# Patient Record
Sex: FEMALE | URBAN - METROPOLITAN AREA
[De-identification: names, ages, dates, MRNs, and addresses within clinical notes are randomized per-mention and may not be internally consistent; named-entity substitution may affect disease eponyms.]

---

## 2017-11-14 ENCOUNTER — INPATIENT (INPATIENT)
Facility: HOSPITAL | Age: 62
LOS: 1 days | Discharge: HOME HEALTH | End: 2017-11-16
Attending: INTERNAL MEDICINE | Admitting: INTERNAL MEDICINE
Payer: COMMERCIAL

## 2017-11-14 VITALS
HEART RATE: 100 BPM | TEMPERATURE: 98 F | SYSTOLIC BLOOD PRESSURE: 122 MMHG | DIASTOLIC BLOOD PRESSURE: 61 MMHG | RESPIRATION RATE: 16 BRPM | OXYGEN SATURATION: 100 %

## 2017-11-14 LAB
ALBUMIN SERPL ELPH-MCNC: 4.4 G/DL — SIGNIFICANT CHANGE UP (ref 3.3–5)
ALP SERPL-CCNC: 45 U/L — SIGNIFICANT CHANGE UP (ref 40–120)
ALT FLD-CCNC: 23 U/L — SIGNIFICANT CHANGE UP (ref 4–33)
APPEARANCE UR: CLEAR — SIGNIFICANT CHANGE UP
AST SERPL-CCNC: 20 U/L — SIGNIFICANT CHANGE UP (ref 4–32)
BASOPHILS # BLD AUTO: 0.01 K/UL — SIGNIFICANT CHANGE UP (ref 0–0.2)
BASOPHILS NFR BLD AUTO: 0.2 % — SIGNIFICANT CHANGE UP (ref 0–2)
BILIRUB SERPL-MCNC: 0.4 MG/DL — SIGNIFICANT CHANGE UP (ref 0.2–1.2)
BILIRUB UR-MCNC: NEGATIVE — SIGNIFICANT CHANGE UP
BLOOD UR QL VISUAL: HIGH
BUN SERPL-MCNC: 15 MG/DL — SIGNIFICANT CHANGE UP (ref 7–23)
CALCIUM SERPL-MCNC: 9.6 MG/DL — SIGNIFICANT CHANGE UP (ref 8.4–10.5)
CHLORIDE SERPL-SCNC: 102 MMOL/L — SIGNIFICANT CHANGE UP (ref 98–107)
CO2 SERPL-SCNC: 31 MMOL/L — SIGNIFICANT CHANGE UP (ref 22–31)
COLOR SPEC: YELLOW — SIGNIFICANT CHANGE UP
CREAT SERPL-MCNC: 0.78 MG/DL — SIGNIFICANT CHANGE UP (ref 0.5–1.3)
CRP SERPL-MCNC: 0.4 MG/L — SIGNIFICANT CHANGE UP
EOSINOPHIL # BLD AUTO: 0.09 K/UL — SIGNIFICANT CHANGE UP (ref 0–0.5)
EOSINOPHIL NFR BLD AUTO: 1.5 % — SIGNIFICANT CHANGE UP (ref 0–6)
ERYTHROCYTE [SEDIMENTATION RATE] IN BLOOD: 16 MM/HR — SIGNIFICANT CHANGE UP (ref 4–25)
GLUCOSE SERPL-MCNC: 128 MG/DL — HIGH (ref 70–99)
GLUCOSE UR-MCNC: NEGATIVE — SIGNIFICANT CHANGE UP
HCT VFR BLD CALC: 36.8 % — SIGNIFICANT CHANGE UP (ref 34.5–45)
HGB BLD-MCNC: 11.7 G/DL — SIGNIFICANT CHANGE UP (ref 11.5–15.5)
IMM GRANULOCYTES # BLD AUTO: 0.01 # — SIGNIFICANT CHANGE UP
IMM GRANULOCYTES NFR BLD AUTO: 0.2 % — SIGNIFICANT CHANGE UP (ref 0–1.5)
KETONES UR-MCNC: NEGATIVE — SIGNIFICANT CHANGE UP
LEUKOCYTE ESTERASE UR-ACNC: NEGATIVE — SIGNIFICANT CHANGE UP
LYMPHOCYTES # BLD AUTO: 2.11 K/UL — SIGNIFICANT CHANGE UP (ref 1–3.3)
LYMPHOCYTES # BLD AUTO: 36.1 % — SIGNIFICANT CHANGE UP (ref 13–44)
MCHC RBC-ENTMCNC: 30.5 PG — SIGNIFICANT CHANGE UP (ref 27–34)
MCHC RBC-ENTMCNC: 31.8 % — LOW (ref 32–36)
MCV RBC AUTO: 95.8 FL — SIGNIFICANT CHANGE UP (ref 80–100)
MONOCYTES # BLD AUTO: 0.62 K/UL — SIGNIFICANT CHANGE UP (ref 0–0.9)
MONOCYTES NFR BLD AUTO: 10.6 % — SIGNIFICANT CHANGE UP (ref 2–14)
MUCOUS THREADS # UR AUTO: SIGNIFICANT CHANGE UP
NEUTROPHILS # BLD AUTO: 3 K/UL — SIGNIFICANT CHANGE UP (ref 1.8–7.4)
NEUTROPHILS NFR BLD AUTO: 51.4 % — SIGNIFICANT CHANGE UP (ref 43–77)
NITRITE UR-MCNC: NEGATIVE — SIGNIFICANT CHANGE UP
NON-SQ EPI CELLS # UR AUTO: <1 — SIGNIFICANT CHANGE UP
NRBC # FLD: 0 — SIGNIFICANT CHANGE UP
PH UR: 6.5 — SIGNIFICANT CHANGE UP (ref 4.6–8)
PLATELET # BLD AUTO: 224 K/UL — SIGNIFICANT CHANGE UP (ref 150–400)
PMV BLD: 11.1 FL — SIGNIFICANT CHANGE UP (ref 7–13)
POTASSIUM SERPL-MCNC: 4 MMOL/L — SIGNIFICANT CHANGE UP (ref 3.5–5.3)
POTASSIUM SERPL-SCNC: 4 MMOL/L — SIGNIFICANT CHANGE UP (ref 3.5–5.3)
PROT SERPL-MCNC: 7.5 G/DL — SIGNIFICANT CHANGE UP (ref 6–8.3)
PROT UR-MCNC: 10 — SIGNIFICANT CHANGE UP
RBC # BLD: 3.84 M/UL — SIGNIFICANT CHANGE UP (ref 3.8–5.2)
RBC # FLD: 12.8 % — SIGNIFICANT CHANGE UP (ref 10.3–14.5)
RBC CASTS # UR COMP ASSIST: SIGNIFICANT CHANGE UP (ref 0–?)
SODIUM SERPL-SCNC: 145 MMOL/L — SIGNIFICANT CHANGE UP (ref 135–145)
SP GR SPEC: 1.02 — SIGNIFICANT CHANGE UP (ref 1–1.03)
SQUAMOUS # UR AUTO: SIGNIFICANT CHANGE UP
UROBILINOGEN FLD QL: 1 E.U. — SIGNIFICANT CHANGE UP (ref 0.1–0.2)
WBC # BLD: 5.84 K/UL — SIGNIFICANT CHANGE UP (ref 3.8–10.5)
WBC # FLD AUTO: 5.84 K/UL — SIGNIFICANT CHANGE UP (ref 3.8–10.5)
WBC CLUMPS #/AREA URNS HPF: PRESENT — HIGH (ref 0–?)
WBC UR QL: SIGNIFICANT CHANGE UP (ref 0–?)

## 2017-11-14 RX ORDER — MORPHINE SULFATE 50 MG/1
4 CAPSULE, EXTENDED RELEASE ORAL ONCE
Qty: 0 | Refills: 0 | Status: DISCONTINUED | OUTPATIENT
Start: 2017-11-14 | End: 2017-11-14

## 2017-11-14 RX ORDER — KETOROLAC TROMETHAMINE 30 MG/ML
30 SYRINGE (ML) INJECTION ONCE
Qty: 0 | Refills: 0 | Status: DISCONTINUED | OUTPATIENT
Start: 2017-11-14 | End: 2017-11-14

## 2017-11-14 RX ORDER — SODIUM CHLORIDE 9 MG/ML
3 INJECTION INTRAMUSCULAR; INTRAVENOUS; SUBCUTANEOUS ONCE
Qty: 0 | Refills: 0 | Status: COMPLETED | OUTPATIENT
Start: 2017-11-14 | End: 2017-11-14

## 2017-11-14 RX ADMIN — Medication 30 MILLIGRAM(S): at 23:58

## 2017-11-14 RX ADMIN — MORPHINE SULFATE 4 MILLIGRAM(S): 50 CAPSULE, EXTENDED RELEASE ORAL at 21:14

## 2017-11-14 RX ADMIN — MORPHINE SULFATE 4 MILLIGRAM(S): 50 CAPSULE, EXTENDED RELEASE ORAL at 21:31

## 2017-11-14 RX ADMIN — SODIUM CHLORIDE 3 MILLILITER(S): 9 INJECTION INTRAMUSCULAR; INTRAVENOUS; SUBCUTANEOUS at 21:13

## 2017-11-14 NOTE — ED PROVIDER NOTE - ATTENDING CONTRIBUTION TO CARE
RISA Attending Note - Dr. Pagan   63 y/o female pmh MS c/o b/l lE pain x2 weeks and low back pain x2 days with paresthesia similar to past MS exacerbations. PE: pt is alert and oriented, perrl, ent normal, membranes are moist, neck supple. no lymphadenopathy or thyroid enlargement, No JVD.  Chest clear to P&A, Heart- reg rhythm without murmur, rubs or gallops, radial pulses equal bilaterally.  Abd is soft, non-tender, Bowel sounds are active. no mass or organomegaly. : No CVA tenderness. Neuro:  Pt alert and oriented x 3. Perrl    Distal neurosensory -pt has subjective numbness and paresthesia left lower leg./. Motor function is 5/5 strength bilaterally.  . Extremities:  No edema.  Skin: warm and dry.  Impression:  MS flare with back pain  Plan: analgesia, muscle relaxation, for pain control labs and possible admission if not relieved.

## 2017-11-14 NOTE — ED ADULT TRIAGE NOTE - CHIEF COMPLAINT QUOTE
Pt c/o lower back pain since Mon morning.  States it presents like this when she has an MS flair up.  Denies trauma

## 2017-11-14 NOTE — ED ADULT NURSE NOTE - OBJECTIVE STATEMENT
Gato Rn note: Pt arrives to rm [address]" I have MS and when I get a flare up I get these bad muscle spasms....this has not happen to me in long time but last time it happe I was in hospital and I think they treated with steriods and oxycodone.....this pain started yesterday. I usually use walker/cane/wheelchair to get around but I can barely get up and move at all." Denies fall, denies any recent trauma. Pt positioned for comofort. call cory in reach. CRUZITO Gomez at bedside to [email or web address] and labs to follow. Handoff report to Primary RN Cristela Gato Rn note: Pt arrives to rm [address] tear[address]" I have MS and when I get a flare up I get these bad muscle spasms....this has not happen to me in long time but last time it happen I was in hospital and I think they treated with steriods and oxycodone.....this pain started yesterday. I usually use walker/cane/wheelchair to get around but I can barely get up and move at all." Denies fall, denies any recent trauma. Pt positioned for comfort call ray in reach. CRUZITO Fuentes at bedside to [email or web address] and labs to follow. Handoff report to Primary RN Cristela

## 2017-11-14 NOTE — ED PROVIDER NOTE - OBJECTIVE STATEMENT
61 y/o female pmh MS c/o b/l lE pain x2 weeks and low back pain x2 days. Pt admits to intermittent b/l LE pain over the last 2 weeks, with new paresthesias in LLE. Pt admits to usually having paresthesias in RLE but feeling the tingling in her left leg now. Pt admits to developing severe low back pain x2 days. PT admits to feeling similar to previous MS flairs. PT admits to doubling her muscle relaxer and gabapentin and taking naproxen with little relief. Pt denies chest pain, sob, abd pain, n/v/d, bowel or bladder incontinence, numbness ,weakness, dizziness, syncope, fever or chills.

## 2017-11-14 NOTE — ED PROVIDER NOTE - PROGRESS NOTE DETAILS
Klepfish: Pain still not well controlled. Admitting for further pain control, likely MS flare. hospitalist requesting neuro consult.

## 2017-11-15 DIAGNOSIS — M54.5 LOW BACK PAIN: ICD-10-CM

## 2017-11-15 DIAGNOSIS — G35 MULTIPLE SCLEROSIS: ICD-10-CM

## 2017-11-15 DIAGNOSIS — I10 ESSENTIAL (PRIMARY) HYPERTENSION: ICD-10-CM

## 2017-11-15 DIAGNOSIS — E78.5 HYPERLIPIDEMIA, UNSPECIFIED: ICD-10-CM

## 2017-11-15 DIAGNOSIS — Z29.9 ENCOUNTER FOR PROPHYLACTIC MEASURES, UNSPECIFIED: ICD-10-CM

## 2017-11-15 DIAGNOSIS — M62.838 OTHER MUSCLE SPASM: ICD-10-CM

## 2017-11-15 LAB
BUN SERPL-MCNC: 11 MG/DL — SIGNIFICANT CHANGE UP (ref 7–23)
CALCIUM SERPL-MCNC: 10.1 MG/DL — SIGNIFICANT CHANGE UP (ref 8.4–10.5)
CHLORIDE SERPL-SCNC: 99 MMOL/L — SIGNIFICANT CHANGE UP (ref 98–107)
CO2 SERPL-SCNC: 23 MMOL/L — SIGNIFICANT CHANGE UP (ref 22–31)
CREAT SERPL-MCNC: 0.81 MG/DL — SIGNIFICANT CHANGE UP (ref 0.5–1.3)
GLUCOSE SERPL-MCNC: 191 MG/DL — HIGH (ref 70–99)
HCT VFR BLD CALC: 41.8 % — SIGNIFICANT CHANGE UP (ref 34.5–45)
HGB BLD-MCNC: 13.2 G/DL — SIGNIFICANT CHANGE UP (ref 11.5–15.5)
MCHC RBC-ENTMCNC: 29.8 PG — SIGNIFICANT CHANGE UP (ref 27–34)
MCHC RBC-ENTMCNC: 31.6 % — LOW (ref 32–36)
MCV RBC AUTO: 94.4 FL — SIGNIFICANT CHANGE UP (ref 80–100)
NRBC # FLD: 0 — SIGNIFICANT CHANGE UP
PLATELET # BLD AUTO: 296 K/UL — SIGNIFICANT CHANGE UP (ref 150–400)
PMV BLD: 11.6 FL — SIGNIFICANT CHANGE UP (ref 7–13)
POTASSIUM SERPL-MCNC: 4.1 MMOL/L — SIGNIFICANT CHANGE UP (ref 3.5–5.3)
POTASSIUM SERPL-SCNC: 4.1 MMOL/L — SIGNIFICANT CHANGE UP (ref 3.5–5.3)
RBC # BLD: 4.43 M/UL — SIGNIFICANT CHANGE UP (ref 3.8–5.2)
RBC # FLD: 12.7 % — SIGNIFICANT CHANGE UP (ref 10.3–14.5)
SODIUM SERPL-SCNC: 141 MMOL/L — SIGNIFICANT CHANGE UP (ref 135–145)
WBC # BLD: 5.62 K/UL — SIGNIFICANT CHANGE UP (ref 3.8–10.5)
WBC # FLD AUTO: 5.62 K/UL — SIGNIFICANT CHANGE UP (ref 3.8–10.5)

## 2017-11-15 PROCEDURE — 72156 MRI NECK SPINE W/O & W/DYE: CPT | Mod: 26

## 2017-11-15 PROCEDURE — 99223 1ST HOSP IP/OBS HIGH 75: CPT

## 2017-11-15 PROCEDURE — 72157 MRI CHEST SPINE W/O & W/DYE: CPT | Mod: 26

## 2017-11-15 PROCEDURE — 70553 MRI BRAIN STEM W/O & W/DYE: CPT | Mod: 26

## 2017-11-15 PROCEDURE — 72158 MRI LUMBAR SPINE W/O & W/DYE: CPT | Mod: 26

## 2017-11-15 RX ORDER — DIMETHYL FUMARATE 240 MG/1
240 CAPSULE ORAL
Qty: 0 | Refills: 0 | Status: DISCONTINUED | OUTPATIENT
Start: 2017-11-15 | End: 2017-11-16

## 2017-11-15 RX ORDER — GABAPENTIN 400 MG/1
300 CAPSULE ORAL
Qty: 0 | Refills: 0 | Status: DISCONTINUED | OUTPATIENT
Start: 2017-11-15 | End: 2017-11-16

## 2017-11-15 RX ORDER — ATORVASTATIN CALCIUM 80 MG/1
40 TABLET, FILM COATED ORAL AT BEDTIME
Qty: 0 | Refills: 0 | Status: DISCONTINUED | OUTPATIENT
Start: 2017-11-15 | End: 2017-11-16

## 2017-11-15 RX ORDER — SENNA PLUS 8.6 MG/1
2 TABLET ORAL AT BEDTIME
Qty: 0 | Refills: 0 | Status: DISCONTINUED | OUTPATIENT
Start: 2017-11-15 | End: 2017-11-16

## 2017-11-15 RX ORDER — LORATADINE 10 MG/1
10 TABLET ORAL DAILY
Qty: 0 | Refills: 0 | Status: DISCONTINUED | OUTPATIENT
Start: 2017-11-15 | End: 2017-11-16

## 2017-11-15 RX ORDER — OXYBUTYNIN CHLORIDE 5 MG
5 TABLET ORAL
Qty: 0 | Refills: 0 | Status: DISCONTINUED | OUTPATIENT
Start: 2017-11-15 | End: 2017-11-16

## 2017-11-15 RX ORDER — ASCORBIC ACID 60 MG
1 TABLET,CHEWABLE ORAL
Qty: 0 | Refills: 0 | COMMUNITY

## 2017-11-15 RX ORDER — LISINOPRIL 2.5 MG/1
20 TABLET ORAL DAILY
Qty: 0 | Refills: 0 | Status: DISCONTINUED | OUTPATIENT
Start: 2017-11-15 | End: 2017-11-16

## 2017-11-15 RX ORDER — CYCLOBENZAPRINE HYDROCHLORIDE 10 MG/1
5 TABLET, FILM COATED ORAL THREE TIMES A DAY
Qty: 0 | Refills: 0 | Status: DISCONTINUED | OUTPATIENT
Start: 2017-11-15 | End: 2017-11-15

## 2017-11-15 RX ORDER — AMLODIPINE BESYLATE 2.5 MG/1
10 TABLET ORAL DAILY
Qty: 0 | Refills: 0 | Status: DISCONTINUED | OUTPATIENT
Start: 2017-11-15 | End: 2017-11-16

## 2017-11-15 RX ORDER — ACETAMINOPHEN 500 MG
650 TABLET ORAL EVERY 6 HOURS
Qty: 0 | Refills: 0 | Status: DISCONTINUED | OUTPATIENT
Start: 2017-11-15 | End: 2017-11-16

## 2017-11-15 RX ORDER — AMLODIPINE BESYLATE AND BENAZEPRIL HYDROCHLORIDE 10; 20 MG/1; MG/1
1 CAPSULE ORAL
Qty: 0 | Refills: 0 | COMMUNITY

## 2017-11-15 RX ORDER — ROSUVASTATIN CALCIUM 5 MG/1
1 TABLET ORAL
Qty: 0 | Refills: 0 | COMMUNITY

## 2017-11-15 RX ORDER — CYCLOBENZAPRINE HYDROCHLORIDE 10 MG/1
10 TABLET, FILM COATED ORAL EVERY 8 HOURS
Qty: 0 | Refills: 0 | Status: DISCONTINUED | OUTPATIENT
Start: 2017-11-15 | End: 2017-11-16

## 2017-11-15 RX ORDER — SOLIFENACIN SUCCINATE 10 MG/1
1 TABLET ORAL
Qty: 0 | Refills: 0 | COMMUNITY

## 2017-11-15 RX ORDER — GABAPENTIN 400 MG/1
1 CAPSULE ORAL
Qty: 0 | Refills: 0 | COMMUNITY

## 2017-11-15 RX ORDER — LOSARTAN POTASSIUM 100 MG/1
100 TABLET, FILM COATED ORAL DAILY
Qty: 0 | Refills: 0 | Status: DISCONTINUED | OUTPATIENT
Start: 2017-11-15 | End: 2017-11-16

## 2017-11-15 RX ORDER — DIMETHYL FUMARATE 240 MG/1
1 CAPSULE ORAL
Qty: 0 | Refills: 0 | COMMUNITY

## 2017-11-15 RX ORDER — TIZANIDINE 4 MG/1
1 TABLET ORAL
Qty: 0 | Refills: 0 | COMMUNITY

## 2017-11-15 RX ORDER — INFLUENZA VIRUS VACCINE 15; 15; 15; 15 UG/.5ML; UG/.5ML; UG/.5ML; UG/.5ML
0.5 SUSPENSION INTRAMUSCULAR ONCE
Qty: 0 | Refills: 0 | Status: DISCONTINUED | OUTPATIENT
Start: 2017-11-15 | End: 2017-11-16

## 2017-11-15 RX ORDER — LOSARTAN POTASSIUM 100 MG/1
1 TABLET, FILM COATED ORAL
Qty: 0 | Refills: 0 | COMMUNITY

## 2017-11-15 RX ORDER — MORPHINE SULFATE 50 MG/1
4 CAPSULE, EXTENDED RELEASE ORAL ONCE
Qty: 0 | Refills: 0 | Status: DISCONTINUED | OUTPATIENT
Start: 2017-11-15 | End: 2017-11-15

## 2017-11-15 RX ORDER — HEPARIN SODIUM 5000 [USP'U]/ML
5000 INJECTION INTRAVENOUS; SUBCUTANEOUS EVERY 8 HOURS
Qty: 0 | Refills: 0 | Status: DISCONTINUED | OUTPATIENT
Start: 2017-11-15 | End: 2017-11-16

## 2017-11-15 RX ORDER — MORPHINE SULFATE 50 MG/1
2 CAPSULE, EXTENDED RELEASE ORAL EVERY 4 HOURS
Qty: 0 | Refills: 0 | Status: DISCONTINUED | OUTPATIENT
Start: 2017-11-15 | End: 2017-11-16

## 2017-11-15 RX ADMIN — Medication 5 MILLIGRAM(S): at 06:04

## 2017-11-15 RX ADMIN — LISINOPRIL 20 MILLIGRAM(S): 2.5 TABLET ORAL at 06:04

## 2017-11-15 RX ADMIN — HEPARIN SODIUM 5000 UNIT(S): 5000 INJECTION INTRAVENOUS; SUBCUTANEOUS at 15:19

## 2017-11-15 RX ADMIN — GABAPENTIN 300 MILLIGRAM(S): 400 CAPSULE ORAL at 06:04

## 2017-11-15 RX ADMIN — AMLODIPINE BESYLATE 10 MILLIGRAM(S): 2.5 TABLET ORAL at 06:04

## 2017-11-15 RX ADMIN — GABAPENTIN 300 MILLIGRAM(S): 400 CAPSULE ORAL at 17:49

## 2017-11-15 RX ADMIN — MORPHINE SULFATE 4 MILLIGRAM(S): 50 CAPSULE, EXTENDED RELEASE ORAL at 02:16

## 2017-11-15 RX ADMIN — GABAPENTIN 300 MILLIGRAM(S): 400 CAPSULE ORAL at 12:46

## 2017-11-15 RX ADMIN — Medication 30 MILLIGRAM(S): at 00:13

## 2017-11-15 RX ADMIN — Medication 1 TABLET(S): at 15:19

## 2017-11-15 RX ADMIN — LORATADINE 10 MILLIGRAM(S): 10 TABLET ORAL at 15:19

## 2017-11-15 RX ADMIN — Medication 108 MILLIGRAM(S): at 02:01

## 2017-11-15 RX ADMIN — LOSARTAN POTASSIUM 100 MILLIGRAM(S): 100 TABLET, FILM COATED ORAL at 06:04

## 2017-11-15 RX ADMIN — ATORVASTATIN CALCIUM 40 MILLIGRAM(S): 80 TABLET, FILM COATED ORAL at 21:57

## 2017-11-15 RX ADMIN — MORPHINE SULFATE 4 MILLIGRAM(S): 50 CAPSULE, EXTENDED RELEASE ORAL at 02:01

## 2017-11-15 RX ADMIN — HEPARIN SODIUM 5000 UNIT(S): 5000 INJECTION INTRAVENOUS; SUBCUTANEOUS at 06:04

## 2017-11-15 RX ADMIN — CYCLOBENZAPRINE HYDROCHLORIDE 10 MILLIGRAM(S): 10 TABLET, FILM COATED ORAL at 21:57

## 2017-11-15 RX ADMIN — Medication 104 MILLIGRAM(S): at 17:49

## 2017-11-15 RX ADMIN — Medication 5 MILLIGRAM(S): at 17:49

## 2017-11-15 NOTE — CONSULT NOTE ADULT - SUBJECTIVE AND OBJECTIVE BOX
Neurology Consult    Name  HOWARD BILLINGSLEY 62F with history of MS (dx in 2005, has chronic RUE and RLE numbness and weakness), HTN, HLD, and OA who presents to the hospital with complaints of worsening LLE weakness, numbness and tingling now complicated by muscle spasms of her lower back. Said that her MS had been stable since her last MS exacerbation in 2012 until this summer. She had started to note some weakness and numbness of her LLE in July/August and was supposed to visit her neurologist for evaluation but had delayed on the visit because she had family issues come up that caused her to go to [place]. She said that over the past 2 weeks she has noted significantly worsening numbness and tingling of her LLE with significant weakness also. She usually walks with the aid of a cane/walker but recently has had to use a motorized wheelchair more frequently. Said that she has also had increasing muscle spasms in her lower back, especially over the past 2 days, and these have kept her from being able to sleep. She has been taking her home medications for her symptoms without any relief and therefore decided to come to the hospital for further evaluation. She currently said that she is having LLE weakness, numbness and tingling. Said that she has lower back pain/muscle spasms when she moves but not when she is laying still. Denies any perineal numbness, denies any urinary or fecal incontinence. No other complaints. States that her last MS exacerbation was in 2012, had similar symptoms at that time also.                                                                 MEDICATIONS  (STANDING):  amLODIPine   Tablet 10 milliGRAM(s) Oral daily  atorvastatin 40 milliGRAM(s) Oral at bedtime  gabapentin 300 milliGRAM(s) Oral four times a day  heparin  Injectable 5000 Unit(s) SubCutaneous every 8 hours  lisinopril 20 milliGRAM(s) Oral daily  loratadine 10 milliGRAM(s) Oral daily  losartan 100 milliGRAM(s) Oral daily  multivitamin 1 Tablet(s) Oral daily  oxybutynin 5 milliGRAM(s) Oral two times a day    MEDICATIONS  (PRN):  acetaminophen   Tablet. 650 milliGRAM(s) Oral every 6 hours PRN Mild to moderate pain  bisacodyl 5 milliGRAM(s) Oral daily PRN Constipation  cyclobenzaprine 5 milliGRAM(s) Oral three times a day PRN Muscle Spasm  morphine  - Injectable 2 milliGRAM(s) IV Push every 4 hours PRN Severe Pain (7 - 10)  senna 2 Tablet(s) Oral at bedtime PRN Constipation      Allergies    penicillin (Unknown)    Intolerances        Objective  Vital Signs Last 24 Hrs  T(C): 37 (15 Nov 2017 04:13), Max: 37.1 (15 Nov 2017 00:02)  T(F): 98.6 (15 Nov 2017 04:13), Max: 98.8 (15 Nov 2017 00:02)  HR: 95 (15 Nov 2017 04:13) (73 - 100)  BP: 128/61 (15 Nov 2017 04:13) (107/50 - 137/63)  BP(mean): --  RR: 18 (15 Nov 2017 04:13) (15 - 18)  SpO2: 100% (15 Nov 2017 04:13) (100% - 100%)    General Exam   General appearance: No acute distress, well-nourished  Respiratory:    non-labored respirations               Neurological Exam  Mental Status:  alert and oriented x3, fluent speech, following commands, repetition and naming intact    Cranial Nerves: PERRL, EOMI without nystagmus, visual fields intact no facial droop, no dysarthria    Motor:   Tone:   normal               Strength:  Upper extremity                          Delt       Bicep    Tricep                                                  R         5/5        5/5        5/5       5/5                                               L          5/5        5/5        5/5      5/5    Lower extremity                           HF          KE          KF        DF         PF                                               R        5/5        5/5        5/5       5/5       5/5                                               L         5/5        5/5        5/5      5/5        5/5    Pronator drift:   none           Dysmetria: none with finger-to-nose testing  Tremor:  none appreciated at rest or in action    Sensation: intact grossly to light touch    Deep Tendon Reflexes:   Toes flexor bilaterally ________    Gait:     Other Studies    11-14    145  |  102  |  15  ----------------------------<  128<H>  4.0   |  31  |  0.78    Ca    9.6      14 Nov 2017 21:22    TPro  7.5  /  Alb  4.4  /  TBili  0.4  /  DBili  x   /  AST  20  /  ALT  23  /  AlkPhos  45  11-14 11-14    145  |  102  |  15  ----------------------------<  128<H>  4.0   |  31  |  0.78    Ca    9.6      14 Nov 2017 21:22    TPro  7.5  /  Alb  4.4  /  TBili  0.4  /  DBili  x   /  AST  20  /  ALT  23  /  AlkPhos  45  11-14    LIVER FUNCTIONS - ( 14 Nov 2017 21:22 )  Alb: 4.4 g/dL / Pro: 7.5 g/dL / ALK PHOS: 45 u/L / ALT: 23 u/L / AST: 20 u/L / GGT: x             Radiology    CTh/CTA:  MRI/MRA:  TTE:  EEG: Neurology Consult    Name  HOWARD BILLINGSLEY 62F with history of MS (dx in 2005, has chronic RUE and RLE numbness and weakness), HTN, HLD, and OA who presents to the hospital with complaints of worsening LLE weakness, numbness and tingling now complicated by pain of her lower back. Said that her MS had been stable since her last MS exacerbation in 2012 until this summer. She had started to note some weakness and numbness of her LLE in July/August and was supposed to visit her neurologist for evaluation but had delayed on the visit because she had family issues come up that caused her to go to [place]. She said that over the past 2 weeks she has noted significantly worsening numbness and tingling of her LLE with significant weakness also. She usually walks with the aid of a cane/walker but recently has had to use a motorized wheelchair more frequently. Said that she has also had increasing pain in her lower back, especially over the past 2 days, and these have kept her from being able to sleep. She has been taking her home medications for her symptoms without any relief and therefore decided to come to the hospital for further evaluation. Said that she has lower back pain/muscle spasms when she moves but not when she is laying still. Denies any urinary or fecal incontinence. No other complaints. States that her last MS exacerbation was in 2012, had similar symptoms at that time also.                                                                 MEDICATIONS  (STANDING):  amLODIPine   Tablet 10 milliGRAM(s) Oral daily  atorvastatin 40 milliGRAM(s) Oral at bedtime  gabapentin 300 milliGRAM(s) Oral four times a day  heparin  Injectable 5000 Unit(s) SubCutaneous every 8 hours  lisinopril 20 milliGRAM(s) Oral daily  loratadine 10 milliGRAM(s) Oral daily  losartan 100 milliGRAM(s) Oral daily  multivitamin 1 Tablet(s) Oral daily  oxybutynin 5 milliGRAM(s) Oral two times a day    MEDICATIONS  (PRN):  acetaminophen   Tablet. 650 milliGRAM(s) Oral every 6 hours PRN Mild to moderate pain  bisacodyl 5 milliGRAM(s) Oral daily PRN Constipation  cyclobenzaprine 5 milliGRAM(s) Oral three times a day PRN Muscle Spasm  morphine  - Injectable 2 milliGRAM(s) IV Push every 4 hours PRN Severe Pain (7 - 10)  senna 2 Tablet(s) Oral at bedtime PRN Constipation      Allergies    penicillin (Unknown)    Intolerances        Objective  Vital Signs Last 24 Hrs  T(C): 37 (15 Nov 2017 04:13), Max: 37.1 (15 Nov 2017 00:02)  T(F): 98.6 (15 Nov 2017 04:13), Max: 98.8 (15 Nov 2017 00:02)  HR: 95 (15 Nov 2017 04:13) (73 - 100)  BP: 128/61 (15 Nov 2017 04:13) (107/50 - 137/63)  BP(mean): --  RR: 18 (15 Nov 2017 04:13) (15 - 18)  SpO2: 100% (15 Nov 2017 04:13) (100% - 100%)    General Exam   General appearance: No acute distress, well-nourished  Respiratory:    non-labored respirations               Neurological Exam  Mental Status:  alert and oriented x3, fluent speech, following commands, repetition and naming intact    Cranial Nerves: PERRL, EOMI without nystagmus, visual fields intact no facial droop, no dysarthria    Motor:   Tone:   normal               Strength:  Upper extremity                          Delt       Bicep    Tricep                                                  R         4/5        4/5        4/5       4/5                                               L          5/5        5/5        5/5      5/5    Lower extremity                           HF          KE          KF        DF         PF                                               R        3/5        3/5        3/5       0/5       2+/5                                               L         4/5        4/5        4/5      4/5        4/5    Pronator drift:   none           Dysmetria: none with finger-to-nose testing  Tremor:  none appreciated at rest or in action    Sensation: decreased to light touch on right UE/LE    Deep Tendon Reflexes: Brisk throughout UE/LE, Ankle clonus few beats on left side, sustained on right side   Toes  mute bilaterally     Gait: patient able to walk due to pain     Other Studies    11-14    145  |  102  |  15  ----------------------------<  128<H>  4.0   |  31  |  0.78    Ca    9.6      14 Nov 2017 21:22    TPro  7.5  /  Alb  4.4  /  TBili  0.4  /  DBili  x   /  AST  20  /  ALT  23  /  AlkPhos  45  11-14    11-14    145  |  102  |  15  ----------------------------<  128<H>  4.0   |  31  |  0.78    Ca    9.6      14 Nov 2017 21:22    TPro  7.5  /  Alb  4.4  /  TBili  0.4  /  DBili  x   /  AST  20  /  ALT  23  /  AlkPhos  45  11-14    LIVER FUNCTIONS - ( 14 Nov 2017 21:22 )  Alb: 4.4 g/dL / Pro: 7.5 g/dL / ALK PHOS: 45 u/L / ALT: 23 u/L / AST: 20 u/L / GGT: x             Radiology    CTh/CTA:  MRI/MRA:  TTE:  EEG: Neurology Consult    Name  HOWARD BILLINGSLEY 62F with history of MS (dx in 2005, has chronic RUE and RLE numbness and weakness), HTN, HLD, and OA who presents to the hospital with complaints of worsening LLE weakness, numbness and tingling now complicated by pain of her lower back. Said that her MS had been stable since her last MS exacerbation in 2012 until this summer. She had started to note some weakness and numbness of her LLE in July/August and was supposed to visit her neurologist for evaluation but had delayed on the visit because she had family issues come up that caused her to go to [place]. She said that over the past 2 weeks she has noted significantly worsening numbness and tingling of her LLE with significant weakness also. She usually walks with the aid of a cane/walker but recently has had to use a motorized wheelchair more frequently. Said that she has also had increasing pain in her lower back, especially over the past 2 days, and these have kept her from being able to sleep. She has been taking her home medications for her symptoms without any relief and therefore decided to come to the hospital for further evaluation. Said that she has lower back pain/muscle spasms when she moves but not when she is laying still. Denies any urinary or fecal incontinence. No other complaints. States that her last MS exacerbation was in 2012, had similar symptoms at that time also.                                                                 MEDICATIONS  (STANDING):  amLODIPine   Tablet 10 milliGRAM(s) Oral daily  atorvastatin 40 milliGRAM(s) Oral at bedtime  gabapentin 300 milliGRAM(s) Oral four times a day  heparin  Injectable 5000 Unit(s) SubCutaneous every 8 hours  lisinopril 20 milliGRAM(s) Oral daily  loratadine 10 milliGRAM(s) Oral daily  losartan 100 milliGRAM(s) Oral daily  multivitamin 1 Tablet(s) Oral daily  oxybutynin 5 milliGRAM(s) Oral two times a day    MEDICATIONS  (PRN):  acetaminophen   Tablet. 650 milliGRAM(s) Oral every 6 hours PRN Mild to moderate pain  bisacodyl 5 milliGRAM(s) Oral daily PRN Constipation  cyclobenzaprine 5 milliGRAM(s) Oral three times a day PRN Muscle Spasm  morphine  - Injectable 2 milliGRAM(s) IV Push every 4 hours PRN Severe Pain (7 - 10)  senna 2 Tablet(s) Oral at bedtime PRN Constipation      Allergies    penicillin (Unknown)    Intolerances        Objective  Vital Signs Last 24 Hrs  T(C): 37 (15 Nov 2017 04:13), Max: 37.1 (15 Nov 2017 00:02)  T(F): 98.6 (15 Nov 2017 04:13), Max: 98.8 (15 Nov 2017 00:02)  HR: 95 (15 Nov 2017 04:13) (73 - 100)  BP: 128/61 (15 Nov 2017 04:13) (107/50 - 137/63)  BP(mean): --  RR: 18 (15 Nov 2017 04:13) (15 - 18)  SpO2: 100% (15 Nov 2017 04:13) (100% - 100%)    General Exam   General appearance: No acute distress, well-nourished  Respiratory:    non-labored respirations               Neurological Exam  Mental Status:  alert and oriented x3, fluent speech, following commands, repetition and naming intact    Cranial Nerves: PERRL, EOMI without nystagmus, visual fields intact no facial droop, no dysarthria    Motor:   Tone:   normal               Strength:  Upper extremity                          Delt       Bicep    Tricep                                                  R         4/5        4/5        4/5       4/5                                               L          5/5        5/5        5/5      5/5    Lower extremity                           HF          KE          KF        DF         PF                                               R        3/5        3/5        3/5       0/5       2+/5                                               L         3+/5        3+/5        3+/5      3+/5        3+/5    Pronator drift:   none           Dysmetria: none with finger-to-nose testing  Tremor:  none appreciated at rest or in action    Sensation: decreased to light touch on right UE/LE    Deep Tendon Reflexes: Brisk throughout UE/LE, Ankle clonus few beats on left side, sustained on right side   Toes  mute bilaterally     Gait: patient able to walk due to pain     Other Studies    11-14    145  |  102  |  15  ----------------------------<  128<H>  4.0   |  31  |  0.78    Ca    9.6      14 Nov 2017 21:22    TPro  7.5  /  Alb  4.4  /  TBili  0.4  /  DBili  x   /  AST  20  /  ALT  23  /  AlkPhos  45  11-14    11-14    145  |  102  |  15  ----------------------------<  128<H>  4.0   |  31  |  0.78    Ca    9.6      14 Nov 2017 21:22    TPro  7.5  /  Alb  4.4  /  TBili  0.4  /  DBili  x   /  AST  20  /  ALT  23  /  AlkPhos  45  11-14    LIVER FUNCTIONS - ( 14 Nov 2017 21:22 )  Alb: 4.4 g/dL / Pro: 7.5 g/dL / ALK PHOS: 45 u/L / ALT: 23 u/L / AST: 20 u/L / GGT: x             Radiology    CTh/CTA:  MRI/MRA:  TTE:  EEG:

## 2017-11-15 NOTE — H&P ADULT - HISTORY OF PRESENT ILLNESS
This is a 62F with history of MS (dx in 2005, has chronic RUE and RLE numbness and weakness), HTN, HLD, and OA who presents to the hospital with complaints of worsening LLE weakness, numbness and tingling now complicated by muscle spasms of her lower back. Said that her MS had been stable since her last MS exacerbation in 2012 until this summer. She had started to note some weakness and numbness of her LLE in July/August and was supposed to visit her neurologist for evaluation but had delayed on the visit because she had family issues come up that caused her to go to [place]. She said that over the past 2 weeks she has noted significantly worsening numbness and tingling of her LLE with significant weakness also. She usually walks with the aid of a cane/walker but recently has had to use a motorized wheelchair more frequently. Said that she has also had increasing muscle spasms in her lower back, especially over the past 2 days, and these have kept her from being able to sleep. She has been taking her home medications for her symptoms without any relief and therefore decided to come to the hospital for further evaluation. She currently said that she is having LLE weakness, numbness and tingling. Said that she has lower back pain/muscle spasms when she moves but not when she is laying still. Denies any perineal numbness, denies any urinary or fecal incontinence. No other complaints.    In the ED, her vitals were T 97.7, P 100, /61, R 16, O2 sat 100% RA. Her lab work did not reveal any significant abnormalities. She was given morphine 4mg IVP x2, valium 5mg PO x1, toradol 30mg IVP x1, and solumedrol 1000mg IVPB x1. She was admitted to medicine. This is a 62F with history of MS (dx in 2005, has chronic RUE and RLE numbness and weakness), HTN, HLD, and OA who presents to the hospital with complaints of worsening LLE weakness, numbness and tingling now complicated by muscle spasms of her lower back. Said that her MS had been stable since her last MS exacerbation in 2012 until this summer. She had started to note some weakness and numbness of her LLE in July/August and was supposed to visit her neurologist for evaluation but had delayed on the visit because she had family issues come up that caused her to go to [place]. She said that over the past 2 weeks she has noted significantly worsening numbness and tingling of her LLE with significant weakness also. She usually walks with the aid of a cane/walker but recently has had to use a motorized wheelchair more frequently. Said that she has also had increasing muscle spasms in her lower back, especially over the past 2 days, and these have kept her from being able to sleep. She has been taking her home medications for her symptoms without any relief and therefore decided to come to the hospital for further evaluation. She currently said that she is having LLE weakness, numbness and tingling. Said that she has lower back pain/muscle spasms when she moves but not when she is laying still. Denies any perineal numbness, denies any urinary or fecal incontinence. No other complaints. States that her last MS exacerbation was in 2012, had similar symptoms at that time also.     In the ED, her vitals were T 97.7, P 100, /61, R 16, O2 sat 100% RA. Her lab work did not reveal any significant abnormalities. She was given morphine 4mg IVP x2, valium 5mg PO x1, toradol 30mg IVP x1, and solumedrol 1000mg IVPB x1. She was admitted to medicine.

## 2017-11-15 NOTE — H&P ADULT - NSHPSOCIALHISTORY_GEN_ALL_CORE
Social History:    Marital Status:  ( X )    (   ) Single    (   )    (  )   Occupation: Retired, on disability, used to work in [employer] department previously  Lives with: (  ) alone  ( X ) children   ( X ) spouse   (  ) parents  (  ) other    Substance Use (street drugs): ( X ) never used  (  ) other:  Tobacco Usage:  (   ) never smoked   ( X ) former smoker   (   ) current smoker  (smoked 1PPD x 12 years) pack year  (Quit at age 27 ) last cigarette date  Alcohol Usage: Denies

## 2017-11-15 NOTE — H&P ADULT - ASSESSMENT
This is a 62F with history of MS, HTN, HLD, and OA who presents to the hospital with complaints of worsening LLE weakness, numbness and tingling concerning for MS exacerbation.

## 2017-11-15 NOTE — CONSULT NOTE ADULT - ATTENDING COMMENTS
Patient has symptoms which seem to be radicular in origin.  MRI brain and spine shows no enhancing lesions.  She has 5/5 left foot DF today and feels better after steroids.  MRI LS spine shows L5S1 disc protrusion abutting right L5 nerve root.  Would switch steroids to prednisone taper, cont flexeril and gabapentin.  Can be DC'd home from neuro standpoint, she will f/u with her neurologist at [facility]

## 2017-11-15 NOTE — PROGRESS NOTE ADULT - PROBLEM SELECTOR PLAN 1
c/w solumedrol for now  increase flexeril to 10mg tid standing.  start tecfidera at home dose stop solumedrol  prednison 60mg po. decrease by 20mg every 3 days  increase flexeril to 10mg tid standing.  start tecfidera at home dose

## 2017-11-15 NOTE — H&P ADULT - NSHPLABSRESULTS_GEN_ALL_CORE
LABS and ADDITIONAL STUDIES:                        11.7   5.84  )-----------( 224      ( 2017 21:22 )             36.8     11-14    145  |  102  |  15  ----------------------------<  128<H>  4.0   |  31  |  0.78    Ca    9.6      2017 21:22    TPro  7.5  /  Alb  4.4  /  TBili  0.4  /  DBili  x   /  AST  20  /  ALT  23  /  AlkPhos  45  11-14    LIVER FUNCTIONS - ( 2017 21:22 )  Alb: 4.4 g/dL / Pro: 7.5 g/dL / ALK PHOS: 45 u/L / ALT: 23 u/L / AST: 20 u/L / GGT: x           Urinalysis Basic - ( 2017 21:22 )  Color: YELLOW / Appearance: CLEAR / S.017 / pH: 6.5  Gluc: NEGATIVE / Ketone: NEGATIVE  / Bili: NEGATIVE / Urobili: 1 E.U.   Blood: TRACE / Protein: 10 / Nitrite: NEGATIVE   Leuk Esterase: NEGATIVE / RBC: 2-5 / WBC 0-2   Sq Epi: OCC / Non Sq Epi: x / Bacteria: x    ESR 16  CRP 0.4

## 2017-11-15 NOTE — H&P ADULT - NSHPREVIEWOFSYSTEMS_GEN_ALL_CORE
REVIEW OF SYSTEMS:    CONSTITUTIONAL: No generalized weakness, fevers or chills  EYES/ENT: No visual changes;  No dysphagia  NECK: No pain or stiffness  RESPIRATORY: No cough, wheezing, hemoptysis; No shortness of breath  CARDIOVASCULAR: No chest pain or palpitations; No lower extremity edema  GASTROINTESTINAL: No abdominal or epigastric pain. No nausea, vomiting, or hematemesis; No diarrhea or constipation. No melena or hematochezia.  BACK: +Lower back pain/muscle spasms  GENITOURINARY: No dysuria, frequency or hematuria  NEUROLOGICAL: Chronic R sided weakness and numbness, +LLE weakness, numbness and tingling  SKIN: No itching, burning, rashes, or lesions   All other review of systems is negative unless indicated above.

## 2017-11-15 NOTE — H&P ADULT - PROBLEM SELECTOR PLAN 2
- Patient's LBP likely due to muscle spasms  - Will place on trial of flexeril  - Tylenol/morphine prn for pain control

## 2017-11-15 NOTE — CONSULT NOTE ADULT - ASSESSMENT
62F with history of MS, HTN, HLD, and OA who presents to the hospital with complaints of worsening LLE weakness, numbness and tingling concerning for MS exacerbation.      Plan:   - steroids started by ED   - MRI lumbo-sacral spine   - continue home medications 62F with history of MS, HTN, HLD, and OA who presents to the hospital with complaints of worsening LLE weakness, numbness and tingling concerning for MS exacerbation.      Plan:   - solumedrol 500 mg BID for 3 days  - MRI cervical/thoracic w/wo contrast   - MRI brain w/wo contrast   - continue Tecfidera  - continue Gabapentin   - continue home medications 62F with history of MS, HTN, HLD, and OA who presents to the hospital with complaints of worsening LLE weakness, numbness and tingling concerning for MS exacerbation.      Plan:   - solumedrol 500 mg BID for 3 days  - MRI cervical/thoracic w/wo contrast   - MRI brain w/wo contrast   - continue Tecfidera  - continue Gabapentin   - continue home medications   - DVT prophylaxis

## 2017-11-15 NOTE — PHYSICAL THERAPY INITIAL EVALUATION ADULT - PERTINENT HX OF CURRENT PROBLEM, REHAB EVAL
patient presents with left LE weakness and numbness, history of MS (dx in 2005, has chronic RUE and RLE numbness and weakness), HTN, HLD, and OA

## 2017-11-15 NOTE — H&P ADULT - NSHPOUTPATIENTPROVIDERS_GEN_ALL_CORE
PCP - Dr. Sarah Najera [phone]  Neuro - Meghna Wetzel, [facility] [phone] PCP - Dr. Sarah Najera [phone]  Neuro - Dr. Meghna Wetzel [phone]

## 2017-11-15 NOTE — PROGRESS NOTE ADULT - SUBJECTIVE AND OBJECTIVE BOX
Subjective:Interval History - No events overnight    Objective:   Vital Signs Last 24 Hrs  T(C): 36.8 (15 Nov 2017 12:55), Max: 37.1 (15 Nov 2017 00:02)  T(F): 98.3 (15 Nov 2017 12:55), Max: 98.8 (15 Nov 2017 00:02)  HR: 98 (15 Nov 2017 12:55) (73 - 100)  BP: 140/71 (15 Nov 2017 12:55) (107/50 - 144/68)  BP(mean): --  RR: 18 (15 Nov 2017 12:55) (15 - 18)  SpO2: 100% (15 Nov 2017 12:55) (100% - 100%)    Neurological Exam  Mental Status:  alert and oriented x3, fluent speech, following commands, repetition and naming intact    Cranial Nerves: PERRL, EOMI without nystagmus, visual fields intact no facial droop, no dysarthria    Motor:   Tone:   normal               Strength:  Upper extremity                          Delt       Bicep    Tricep                                                  R         4/5        4/5        4/5       4/5                                               L          5/5        5/5        5/5      5/5    Lower extremity                           HF          KE          KF        DF         PF                                               R        3/5        3/5        3/5       0/5       2+/5                                               L         3+/5       3+/5     3+/5   3+/5        3+/5    Pronator drift:   none           Dysmetria: none with finger-to-nose testing  Tremor:  none appreciated at rest or in action    Sensation: decreased to light touch on right UE/LE    Deep Tendon Reflexes: Brisk throughout UE/LE, Ankle clonus few beats on left side, sustained on right side   Toes  mute bilaterally     Gait: patient able to walk due to pain     Other:    11-15    141  |  99  |  11  ----------------------------<  191<H>  4.1   |  23  |  0.81    Ca    10.1      15 Nov 2017 05:40    TPro  7.5  /  Alb  4.4  /  TBili  0.4  /  DBili  x   /  AST  20  /  ALT  23  /  AlkPhos  45  11-14    LIVER FUNCTIONS - ( 14 Nov 2017 21:22 )  Alb: 4.4 g/dL / Pro: 7.5 g/dL / ALK PHOS: 45 u/L / ALT: 23 u/L / AST: 20 u/L / GGT: x                                 13.2   5.62  )-----------( 296      ( 15 Nov 2017 05:40 )             41.8     < from: MR Head w/wo IV Cont (11.15.17 @ 11:08) >  IMPRESSION:  MR brain: Periventricular FLAIR hyperintensities consistent with given   history of MS. No abnormal enhancement or restricted diffusion.  MR cervical/thoracic spine: Multiple foci of nonenhancing T2 prolongation   are seen throughout the cervical and upper thoracic spine region as   described above.  An enhancing T2 hyperintense mass widening the left neural foramen likely   represents a schwannoma, a benign nerve sheath tumor.  MR lumbar spine: No acute findings.    < end of copied text >    MEDICATIONS  (STANDING):  amLODIPine   Tablet 10 milliGRAM(s) Oral daily  atorvastatin 40 milliGRAM(s) Oral at bedtime  gabapentin 300 milliGRAM(s) Oral four times a day  heparin  Injectable 5000 Unit(s) SubCutaneous every 8 hours  influenza   Vaccine 0.5 milliLiter(s) IntraMuscular once  lisinopril 20 milliGRAM(s) Oral daily  loratadine 10 milliGRAM(s) Oral daily  losartan 100 milliGRAM(s) Oral daily  methylPREDNISolone sodium succinate IVPB 500 milliGRAM(s) IV Intermittent two times a day  multivitamin 1 Tablet(s) Oral daily  oxybutynin 5 milliGRAM(s) Oral two times a day    MEDICATIONS  (PRN):  acetaminophen   Tablet. 650 milliGRAM(s) Oral every 6 hours PRN Mild to moderate pain  bisacodyl 5 milliGRAM(s) Oral daily PRN Constipation  cyclobenzaprine 5 milliGRAM(s) Oral three times a day PRN Muscle Spasm  morphine  - Injectable 2 milliGRAM(s) IV Push every 4 hours PRN Severe Pain (7 - 10)  senna 2 Tablet(s) Oral at bedtime PRN Constipation

## 2017-11-16 ENCOUNTER — TRANSCRIPTION ENCOUNTER (OUTPATIENT)
Age: 62
End: 2017-11-16

## 2017-11-16 VITALS
HEART RATE: 97 BPM | SYSTOLIC BLOOD PRESSURE: 136 MMHG | OXYGEN SATURATION: 99 % | DIASTOLIC BLOOD PRESSURE: 78 MMHG | RESPIRATION RATE: 18 BRPM | TEMPERATURE: 98 F

## 2017-11-16 LAB
ALBUMIN SERPL ELPH-MCNC: 4.8 G/DL — SIGNIFICANT CHANGE UP (ref 3.3–5)
ALP SERPL-CCNC: 50 U/L — SIGNIFICANT CHANGE UP (ref 40–120)
ALT FLD-CCNC: 28 U/L — SIGNIFICANT CHANGE UP (ref 4–33)
ANISOCYTOSIS BLD QL: SLIGHT — SIGNIFICANT CHANGE UP
AST SERPL-CCNC: 20 U/L — SIGNIFICANT CHANGE UP (ref 4–32)
BASOPHILS # BLD AUTO: 0.03 K/UL — SIGNIFICANT CHANGE UP (ref 0–0.2)
BASOPHILS NFR BLD AUTO: 0.1 % — SIGNIFICANT CHANGE UP (ref 0–2)
BASOPHILS NFR SPEC: 0 % — SIGNIFICANT CHANGE UP (ref 0–2)
BILIRUB SERPL-MCNC: 0.4 MG/DL — SIGNIFICANT CHANGE UP (ref 0.2–1.2)
BUN SERPL-MCNC: 20 MG/DL — SIGNIFICANT CHANGE UP (ref 7–23)
CALCIUM SERPL-MCNC: 10.3 MG/DL — SIGNIFICANT CHANGE UP (ref 8.4–10.5)
CHLORIDE SERPL-SCNC: 101 MMOL/L — SIGNIFICANT CHANGE UP (ref 98–107)
CO2 SERPL-SCNC: 26 MMOL/L — SIGNIFICANT CHANGE UP (ref 22–31)
CREAT SERPL-MCNC: 0.82 MG/DL — SIGNIFICANT CHANGE UP (ref 0.5–1.3)
EOSINOPHIL # BLD AUTO: 0 K/UL — SIGNIFICANT CHANGE UP (ref 0–0.5)
EOSINOPHIL NFR BLD AUTO: 0 % — SIGNIFICANT CHANGE UP (ref 0–6)
EOSINOPHIL NFR FLD: 0 % — SIGNIFICANT CHANGE UP (ref 0–6)
GIANT PLATELETS BLD QL SMEAR: PRESENT — SIGNIFICANT CHANGE UP
GLUCOSE SERPL-MCNC: 175 MG/DL — HIGH (ref 70–99)
HCT VFR BLD CALC: 37.4 % — SIGNIFICANT CHANGE UP (ref 34.5–45)
HGB BLD-MCNC: 12.4 G/DL — SIGNIFICANT CHANGE UP (ref 11.5–15.5)
IMM GRANULOCYTES # BLD AUTO: 0.23 # — SIGNIFICANT CHANGE UP
IMM GRANULOCYTES NFR BLD AUTO: 0.9 % — SIGNIFICANT CHANGE UP (ref 0–1.5)
LYMPHOCYTES # BLD AUTO: 1.55 K/UL — SIGNIFICANT CHANGE UP (ref 1–3.3)
LYMPHOCYTES # BLD AUTO: 6.2 % — LOW (ref 13–44)
LYMPHOCYTES NFR SPEC AUTO: 6.1 % — LOW (ref 13–44)
MACROCYTES BLD QL: SLIGHT — SIGNIFICANT CHANGE UP
MCHC RBC-ENTMCNC: 30.7 PG — SIGNIFICANT CHANGE UP (ref 27–34)
MCHC RBC-ENTMCNC: 33.2 % — SIGNIFICANT CHANGE UP (ref 32–36)
MCV RBC AUTO: 92.6 FL — SIGNIFICANT CHANGE UP (ref 80–100)
MONOCYTES # BLD AUTO: 0.52 K/UL — SIGNIFICANT CHANGE UP (ref 0–0.9)
MONOCYTES NFR BLD AUTO: 2.1 % — SIGNIFICANT CHANGE UP (ref 2–14)
MONOCYTES NFR BLD: 0.9 % — LOW (ref 2–9)
NEUTROPHIL AB SER-ACNC: 92.1 % — HIGH (ref 43–77)
NEUTROPHILS # BLD AUTO: 22.59 K/UL — HIGH (ref 1.8–7.4)
NEUTROPHILS NFR BLD AUTO: 90.7 % — HIGH (ref 43–77)
NEUTS BAND # BLD: 0.9 % — SIGNIFICANT CHANGE UP (ref 0–6)
NRBC # FLD: 0 — SIGNIFICANT CHANGE UP
PLATELET # BLD AUTO: 249 K/UL — SIGNIFICANT CHANGE UP (ref 150–400)
PLATELET COUNT - ESTIMATE: NORMAL — SIGNIFICANT CHANGE UP
PMV BLD: 11.9 FL — SIGNIFICANT CHANGE UP (ref 7–13)
POTASSIUM SERPL-MCNC: 3.9 MMOL/L — SIGNIFICANT CHANGE UP (ref 3.5–5.3)
POTASSIUM SERPL-SCNC: 3.9 MMOL/L — SIGNIFICANT CHANGE UP (ref 3.5–5.3)
PROT SERPL-MCNC: 8 G/DL — SIGNIFICANT CHANGE UP (ref 6–8.3)
RBC # BLD: 4.04 M/UL — SIGNIFICANT CHANGE UP (ref 3.8–5.2)
RBC # FLD: 12.7 % — SIGNIFICANT CHANGE UP (ref 10.3–14.5)
REVIEW TO FOLLOW: YES — SIGNIFICANT CHANGE UP
SODIUM SERPL-SCNC: 143 MMOL/L — SIGNIFICANT CHANGE UP (ref 135–145)
WBC # BLD: 24.92 K/UL — HIGH (ref 3.8–10.5)
WBC # FLD AUTO: 24.92 K/UL — HIGH (ref 3.8–10.5)

## 2017-11-16 PROCEDURE — 99222 1ST HOSP IP/OBS MODERATE 55: CPT

## 2017-11-16 RX ORDER — DICLOFENAC SODIUM 30 MG/G
0 GEL TOPICAL
Qty: 0 | Refills: 0 | COMMUNITY

## 2017-11-16 RX ORDER — MULTIVIT-MIN/FERROUS GLUCONATE 9 MG/15 ML
1 LIQUID (ML) ORAL
Qty: 0 | Refills: 0 | COMMUNITY

## 2017-11-16 RX ORDER — FEXOFENADINE HCL 30 MG
1 TABLET ORAL
Qty: 0 | Refills: 0 | COMMUNITY

## 2017-11-16 RX ORDER — LANOLIN ALCOHOL/MO/W.PET/CERES
3 CREAM (GRAM) TOPICAL ONCE
Qty: 0 | Refills: 0 | Status: COMPLETED | OUTPATIENT
Start: 2017-11-16 | End: 2017-11-16

## 2017-11-16 RX ADMIN — Medication 3 MILLIGRAM(S): at 00:14

## 2017-11-16 RX ADMIN — CYCLOBENZAPRINE HYDROCHLORIDE 10 MILLIGRAM(S): 10 TABLET, FILM COATED ORAL at 13:08

## 2017-11-16 RX ADMIN — LORATADINE 10 MILLIGRAM(S): 10 TABLET ORAL at 13:08

## 2017-11-16 RX ADMIN — HEPARIN SODIUM 5000 UNIT(S): 5000 INJECTION INTRAVENOUS; SUBCUTANEOUS at 05:36

## 2017-11-16 RX ADMIN — Medication 104 MILLIGRAM(S): at 05:36

## 2017-11-16 RX ADMIN — Medication 1 TABLET(S): at 13:08

## 2017-11-16 RX ADMIN — Medication 5 MILLIGRAM(S): at 05:39

## 2017-11-16 RX ADMIN — GABAPENTIN 300 MILLIGRAM(S): 400 CAPSULE ORAL at 00:14

## 2017-11-16 RX ADMIN — CYCLOBENZAPRINE HYDROCHLORIDE 10 MILLIGRAM(S): 10 TABLET, FILM COATED ORAL at 05:39

## 2017-11-16 RX ADMIN — GABAPENTIN 300 MILLIGRAM(S): 400 CAPSULE ORAL at 13:08

## 2017-11-16 RX ADMIN — LISINOPRIL 20 MILLIGRAM(S): 2.5 TABLET ORAL at 06:08

## 2017-11-16 RX ADMIN — LOSARTAN POTASSIUM 100 MILLIGRAM(S): 100 TABLET, FILM COATED ORAL at 06:08

## 2017-11-16 RX ADMIN — AMLODIPINE BESYLATE 10 MILLIGRAM(S): 2.5 TABLET ORAL at 06:08

## 2017-11-16 RX ADMIN — GABAPENTIN 300 MILLIGRAM(S): 400 CAPSULE ORAL at 05:39

## 2017-11-16 NOTE — DISCHARGE NOTE ADULT - HOME CARE AGENCY
[place] [phone] Nurse to visit within 24 hours of dc.  Physical and Occupational Therapy to make appointment within 7 days of discharge

## 2017-11-16 NOTE — DISCHARGE NOTE ADULT - CARE PLAN
Principal Discharge DX:	Multiple sclerosis exacerbation  Goal:	Continue to take medication as prescribed  Instructions for follow-up, activity and diet:	Follow up with your Neurologist in 1-2 weeks. Call for an appointment.  Complete steroid taper.  Secondary Diagnosis:	Acute bilateral low back pain without sciatica  Goal:	Pt improved  Instructions for follow-up, activity and diet:	Follow up with your Neurologist in 1-2 weeks. Call for an appointment.  Secondary Diagnosis:	HTN (hypertension)  Goal:	Your blood pressure will be controlled.  Instructions for follow-up, activity and diet:	Low salt diet  Activity as tolerated.  Take all medication as prescribed.  Follow up with your medical doctor for routine blood pressure monitoring at your next visit.  Notify your doctor if you have any of the following symptoms:   Dizziness, Lightheadedness, Blurry vision, Headache, Chest pain, Shortness of breath  Secondary Diagnosis:	HLD (hyperlipidemia)  Instructions for follow-up, activity and diet:	C/W taking your Lipitor Principal Discharge DX:	Multiple sclerosis exacerbation  Goal:	Continue to take medication as prescribed  Instructions for follow-up, activity and diet:	Follow up with your Neurologist in 1-2 weeks. Call for an appointment.  Complete steroid taper.  Secondary Diagnosis:	Acute bilateral low back pain without sciatica  Goal:	Pt improved  Instructions for follow-up, activity and diet:	Follow up with your Neurologist in 1-2 weeks. Call for an appointment.  Secondary Diagnosis:	HTN (hypertension)  Goal:	Your blood pressure will be controlled.  Instructions for follow-up, activity and diet:	Low salt diet  Activity as tolerated.  Take all medication as prescribed.  Follow up with your medical doctor for routine blood pressure monitoring at your next visit.  Notify your doctor if you have any of the following symptoms:   Dizziness, Lightheadedness, Blurry vision, Headache, Chest pain, Shortness of breath  Secondary Diagnosis:	HLD (hyperlipidemia)  Instructions for follow-up, activity and diet:	C/W taking your Crestor

## 2017-11-16 NOTE — DISCHARGE NOTE ADULT - PLAN OF CARE
Continue to take medication as prescribed Follow up with your Neurologist in 1-2 weeks. Call for an appointment.  Complete steroid taper. Pt improved Follow up with your Neurologist in 1-2 weeks. Call for an appointment. Your blood pressure will be controlled. Low salt diet  Activity as tolerated.  Take all medication as prescribed.  Follow up with your medical doctor for routine blood pressure monitoring at your next visit.  Notify your doctor if you have any of the following symptoms:   Dizziness, Lightheadedness, Blurry vision, Headache, Chest pain, Shortness of breath C/W taking your Lipitor C/W taking your Crestor

## 2017-11-16 NOTE — PROGRESS NOTE ADULT - ASSESSMENT
This is a 62F with history of MS, HTN, HLD, and OA who presents to the hospital with complaints of worsening LLE weakness, numbness and tingling concerning for MS exacerbation vs radiculopathy

## 2017-11-16 NOTE — DISCHARGE NOTE ADULT - MEDICATION SUMMARY - MEDICATIONS TO TAKE
I will START or STAY ON the medications listed below when I get home from the hospital:    Physical Therapy   -- DX: Multiple Sclerosis, Weakness    -- Indication: For Weakness    predniSONE 20 mg oral tablet  --   20 mg tab(s) by mouth   3 tabs PO qd x's 3 days   2 tabs PO qd x's 3 days   1 tabPO qd x's 3 days then stop  -- It is very important that you take or use this exactly as directed.  Do not skip doses or discontinue unless directed by your doctor.  Obtain medical advice before taking any non-prescription drugs as some may affect the action of this medication.  Take with food or milk.    -- Indication: For Multiple sclerosis exacerbation    losartan 100 mg oral tablet  -- 1 tab(s) by mouth once a day  -- Indication: For HTN (hypertension)    gabapentin 300 mg oral capsule  -- 1 cap(s) by mouth 4 times a day  -- Indication: For Radiculapthy     Crestor 10 mg oral tablet  -- 1 tab(s) by mouth once a day (at bedtime)  -- Indication: For HLD (hyperlipidemia)    amlodipine-benazepril 10 mg-20 mg oral capsule  -- 1 cap(s) by mouth once a day  -- Indication: For HTN (hypertension)    Tecfidera 240 mg oral delayed release capsule  -- 1 cap(s) by mouth 2 times a day  -- Indication: For Multiple sclerosis    bisacodyl 5 mg oral delayed release tablet  -- 1 tab(s) by mouth once a day  -- Indication: For Constipation    tiZANidine 4 mg oral tablet  -- 1 tab(s) by mouth 4 times a day, As Needed  -- Indication: For Muscle spasms    VESIcare 5 mg oral tablet  -- 1 tab(s) by mouth once a day  -- Indication: For Antispasmodic     Multiple Vitamins oral capsule  -- 1 cap(s) by mouth once a day  -- Indication: For Supplement    Vitamin C 1000 mg oral tablet  -- 1 tab(s) by mouth once a day  -- Indication: For Supplement

## 2017-11-16 NOTE — DISCHARGE NOTE ADULT - PATIENT PORTAL LINK FT
“You can access the FollowHealth Patient Portal, offered by [facility], by registering with the following website: [email or web address]”

## 2017-11-16 NOTE — DISCHARGE NOTE ADULT - HOSPITAL COURSE
62 F with history of MS, HTN, HLD, and OA who presents to the hospital with complaints of worsening LLE weakness, numbness and tingling concerning for MS exacerbation.    Hospital Course   Multiple sclerosis exacerbation.   Pt states she is much improved  neurology help appreciated  probable radiculopathy as opposed to MS exacerbation  PT evaluation  recommendation is acute rehab but patient declining  prednisone taper 60, 40, 20 as per neurology.   Continue with Trecfidera and neurontin    Acute bilateral low back pain without sciatica.     improved  will follow up as outpatient with neurology.     Essential hypertension   home meds with hold parameters  continue.     Hyperlipidemia, unspecified hyperlipidemia type.    resume statin.     Dispo: Home

## 2017-11-16 NOTE — PROGRESS NOTE ADULT - SUBJECTIVE AND OBJECTIVE BOX
Patient is a 62y old  Female who presents with a chief complaint of LLE weakness, numbness and tingling (15 Nov 2017 04:12)      SUBJECTIVE / OVERNIGHT EVENTS: No nausea, vomiting or diarrhea, no fever or chills, no dizziness or chest pain, no dysuria or hematuria, no joint pain or swelling    feels better, states gait stable    MEDICATIONS  (STANDING):  amLODIPine   Tablet 10 milliGRAM(s) Oral daily  atorvastatin 40 milliGRAM(s) Oral at bedtime  cyclobenzaprine 10 milliGRAM(s) Oral every 8 hours  dimethyl fumarate DR Capsule 240 milliGRAM(s) Oral two times a day  gabapentin 300 milliGRAM(s) Oral four times a day  heparin  Injectable 5000 Unit(s) SubCutaneous every 8 hours  influenza   Vaccine 0.5 milliLiter(s) IntraMuscular once  lisinopril 20 milliGRAM(s) Oral daily  loratadine 10 milliGRAM(s) Oral daily  losartan 100 milliGRAM(s) Oral daily  multivitamin 1 Tablet(s) Oral daily  oxybutynin 5 milliGRAM(s) Oral two times a day    MEDICATIONS  (PRN):  acetaminophen   Tablet. 650 milliGRAM(s) Oral every 6 hours PRN Mild to moderate pain  bisacodyl 5 milliGRAM(s) Oral daily PRN Constipation  morphine  - Injectable 2 milliGRAM(s) IV Push every 4 hours PRN Severe Pain (7 - 10)  senna 2 Tablet(s) Oral at bedtime PRN Constipation        CAPILLARY BLOOD GLUCOSE        I&O's Summary      PHYSICAL EXAM:  GENERAL: NAD, well-developed  HEAD:  Atraumatic, Normocephalic  EYES: EOMI, PERRLA, conjunctiva and sclera clear  NECK: Supple, No JVD  CHEST/LUNG: no rhonchi, no wheeze, clear to auscultation bilaterally  HEART: S1 S2; No rubs or gallops, no murmurs  ABDOMEN: Soft, Nontender, Nondistended; Bowel sounds present  EXTREMITIES:  No clubbing or cyanosis, + Peripheral Pulses,  no edema  PSYCH: AO x 3 appropriate affect  NEUROLOGY: non-focal  gait stable  SKIN: No rashes or lesions    LABS:                        12.4   24.92 )-----------( 249      ( 2017 06:01 )             37.4     11-16    143  |  101  |  20  ----------------------------<  175<H>  3.9   |  26  |  0.82    Ca    10.3      2017 06:01    TPro  8.0  /  Alb  4.8  /  TBili  0.4  /  DBili  x   /  AST  20  /  ALT  28  /  AlkPhos  50  11-16          Urinalysis Basic - ( 2017 21:22 )    Color: YELLOW / Appearance: CLEAR / S.017 / pH: 6.5  Gluc: NEGATIVE / Ketone: NEGATIVE  / Bili: NEGATIVE / Urobili: 1 E.U.   Blood: TRACE / Protein: 10 / Nitrite: NEGATIVE   Leuk Esterase: NEGATIVE / RBC: 2-5 / WBC 0-2   Sq Epi: OCC / Non Sq Epi: x / Bacteria: x          Consultant(s) Notes Reviewed:      Care Discussed with Consultants/Other Providers:    Contact Number, Dr Almonte [phone]

## 2017-11-17 ENCOUNTER — TRANSCRIPTION ENCOUNTER (OUTPATIENT)
Age: 62
End: 2017-11-17

## 2018-08-03 NOTE — PATIENT PROFILE ADULT. - CENTRAL VENOUS CATHETER
no V-Y Flap Text: The defect edges were debeveled with a #15 scalpel blade.  Given the location of the defect, shape of the defect and the proximity to free margins a V-Y flap was deemed most appropriate.  Using a sterile surgical marker, an appropriate advancement flap was drawn incorporating the defect and placing the expected incisions within the relaxed skin tension lines where possible.    The area thus outlined was incised deep to adipose tissue with a #15 scalpel blade.  The skin margins were undermined to an appropriate distance in all directions utilizing iris scissors.

## 2021-08-03 NOTE — PROGRESS NOTE ADULT - PROBLEM SELECTOR PLAN 1
states much improved  neurology help appreciated  probable radiculopathy as opposed to MS exacerbation  PT evaluation noted recommendation is acute rehab but patient declining  prednisone taper 60, 40, 20 as per neurology You can access the FollowMyHealth Patient Portal offered by [facility] by registering at the following website: [email or web address]. By joining [employer]’s FollowMyHealth portal, you will also be able to view your health information using other applications (apps) compatible with our system.